# Patient Record
Sex: MALE | Race: WHITE | NOT HISPANIC OR LATINO
[De-identification: names, ages, dates, MRNs, and addresses within clinical notes are randomized per-mention and may not be internally consistent; named-entity substitution may affect disease eponyms.]

---
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
## 2018-01-04 ENCOUNTER — TRANSCRIPTION ENCOUNTER (OUTPATIENT)
Age: 18
End: 2018-01-04

## 2018-01-09 ENCOUNTER — APPOINTMENT (OUTPATIENT)
Dept: SURGERY | Facility: CLINIC | Age: 18
End: 2018-01-09
Payer: COMMERCIAL

## 2018-01-09 PROCEDURE — 99024 POSTOP FOLLOW-UP VISIT: CPT

## 2018-07-24 ENCOUNTER — APPOINTMENT (OUTPATIENT)
Dept: SURGERY | Facility: CLINIC | Age: 18
End: 2018-07-24
Payer: COMMERCIAL

## 2018-07-24 DIAGNOSIS — K40.90 UNILATERAL INGUINAL HERNIA, W/OUT OBSTRUCTION OR GANGRENE, NOT SPECIFIED AS RECURRENT: ICD-10-CM

## 2018-07-24 PROCEDURE — 99213 OFFICE O/P EST LOW 20 MIN: CPT

## 2018-10-09 ENCOUNTER — APPOINTMENT (OUTPATIENT)
Dept: SURGERY | Facility: CLINIC | Age: 18
End: 2018-10-09
Payer: COMMERCIAL

## 2018-10-09 DIAGNOSIS — Z87.2 PERSONAL HISTORY OF DISEASES OF THE SKIN AND SUBCUTANEOUS TISSUE: ICD-10-CM

## 2018-10-09 PROCEDURE — 99214 OFFICE O/P EST MOD 30 MIN: CPT

## 2019-03-25 ENCOUNTER — TRANSCRIPTION ENCOUNTER (OUTPATIENT)
Age: 19
End: 2019-03-25

## 2019-10-10 ENCOUNTER — APPOINTMENT (OUTPATIENT)
Dept: SURGERY | Facility: CLINIC | Age: 19
End: 2019-10-10
Payer: COMMERCIAL

## 2019-10-10 VITALS — WEIGHT: 145.2 LBS | HEIGHT: 70 IN | BODY MASS INDEX: 20.79 KG/M2

## 2019-10-10 DIAGNOSIS — S76.211A STRAIN OF ADDUCTOR MUSCLE, FASCIA AND TENDON OF RIGHT THIGH, INITIAL ENCOUNTER: ICD-10-CM

## 2019-10-10 PROCEDURE — 99214 OFFICE O/P EST MOD 30 MIN: CPT

## 2019-10-10 NOTE — ASSESSMENT
[FreeTextEntry1] : Juan presents back to the office approximately 2 years after the repair of his direct right inguinal hernia with mesh still complaining of intermittent discomfort in the right testicle not particularly associated with any significant physical activity. He has seen multiple urologists over the last 2 years with no identifiable source. He had a lower back MRI with no significant pathology. He has seen a pain management specialist who recommended gabapentin but did not recommend ilioinguinal nerve block injections since his pain does not appear to be radiating from his right groin. He presents to the office today for reevaluation and reassurance.\par \par Physical examination demonstrates no evidence of hernia recurrence or delayed wound complications in the right groin. Examination of his left groin demonstrates some mild weakness but no obvious hernia. Both testicles are normal. His umbilical examination is unremarkable. He has gained approximately 5 pounds over the last 2 years and his current BMI is 21.\par \par Juan was counseled and reassured. I agree with his multiple consultations in that there is probably very little to do for his intermittent mild right testicular discomfort other than possibly trying gabapentin, depending on how significant his symptoms are and if they are significant affecting his quality of life. He recently began an exercise regimen at the gym and has had no increased symptoms due to this activity. I believe he can continue exercising as tolerated. He was encouraged to return to me in the future if he develops any hernia related issues, of course.\par \par

## 2019-10-10 NOTE — PHYSICAL EXAM
[JVD] : no jugular venous distention  [Normal Breath Sounds] : Normal breath sounds [No Rash or Lesion] : No rash or lesion [Alert] : alert [Calm] : calm [de-identified] : healthy [de-identified] : normal [de-identified] : soft and flat [de-identified] : normal testicles [de-identified] : no hernia recurrence

## 2019-10-10 NOTE — CONSULT LETTER
[FreeTextEntry1] : Dear Dr. Polo Pierre, \par \par I had the pleasure of seeing your patient, KVNG ZARAGOZA, in my office today. Please see my note below. \par \par Thank you very much for allowing me to participate in the care of this patient. If you have any questions, please do not hesitate to contact me. \par \par \par Respectfully,\par \par Louis Priest M.D., FACS\par  \par \par \par cc: Dr. Cisco Vela\par

## 2019-11-18 ENCOUNTER — TRANSCRIPTION ENCOUNTER (OUTPATIENT)
Age: 19
End: 2019-11-18

## 2020-12-08 ENCOUNTER — APPOINTMENT (OUTPATIENT)
Dept: SURGERY | Facility: CLINIC | Age: 20
End: 2020-12-08
Payer: COMMERCIAL

## 2020-12-08 VITALS — WEIGHT: 135 LBS | HEIGHT: 70 IN | BODY MASS INDEX: 19.33 KG/M2

## 2020-12-08 DIAGNOSIS — N50.819 TESTICULAR PAIN, UNSPECIFIED: ICD-10-CM

## 2020-12-08 DIAGNOSIS — R10.31 RIGHT LOWER QUADRANT PAIN: ICD-10-CM

## 2020-12-08 PROCEDURE — 99072 ADDL SUPL MATRL&STAF TM PHE: CPT

## 2020-12-08 PROCEDURE — 99214 OFFICE O/P EST MOD 30 MIN: CPT

## 2020-12-08 NOTE — PHYSICAL EXAM
[Normal Breath Sounds] : Normal breath sounds [No Rash or Lesion] : No rash or lesion [Alert] : alert [JVD] : no jugular venous distention  [Anxious] : anxious [de-identified] : healthy [de-identified] : normal [de-identified] : soft and flat [de-identified] : normal testicles [de-identified] : no hernia recurrence

## 2020-12-08 NOTE — ASSESSMENT
[FreeTextEntry1] : Juan presents back to the office with his mother approximately 14 months after his most recent visit and more than 3 years after the repair of his direct right inguinal hernia with mesh and concomitant neurectomy on October 25, 2017 with concerns about persistent discomfort in the right groin and in the right testicle.\par \par Although I have seen him multiple times over the last 3 years, his symptoms had dramatically improved and at one point not too long ago he indicated that his symptoms in the right testicle were 1/10 compared to his preoperative symptoms. He has seen multiple specialists with regard to his chronic symptoms including multiple urologists and has had sonograms and an MRI the pelvis demonstrating no evidence of structural damage or recurrent hernia. He saw two surgeons in [facility], Dr. Duane Estrella and Dr. Renaldo Carvajal, who recommended multiple different strategies including nerve block injections (which he tried with little success) and possible reoperative surgery including removal of his old mesh and a new repair laparoscopically. They indicated concern with regard to his plug causing pain. This is highly unlikely as he has a medium-size plug (which is very small) under the floor of the inguinal canal where there are no nerves and not in the internal ring.\par \par Physical examination today demonstrates a very well-healed scar in the right groin with no evidence of hernia recurrence or delayed wound complications. There is no evidence of varicocele or hydrocele. His testicular examination is unremarkable. His cremasteric reflex is slightly delayed on the right as compared to the left. I examined him on 3 separate occasions during this 45 minute visit as the conversation progressed he thought of other concerns which he wanted me to evaluate and inspect.\par \par He was recommended to try gabapentin by a pain management physician in the past and by myself multiple occasions but he has not done so. I strongly reiterated this recommendation with Juan and his mother prior to any surgical reexploration. There is a reasonable chance he will have some improvement with gabapentin. Surgical exploration, removal of old mesh, neurectomies and/or any further reconstruction and/or repair of the right groin should only be considered as a very last option (especially since reoperative surgery carries its own set of risks and possible complications). If he is interested in a laparoscopic approach, he should followup with either of the 2 laparoscopic surgeons he saw in [facility].\par \par I have no other advice to offer Juan, but if he wants me to perform additional surgery in the future (again, as a very last option) he will return to me at that time.

## 2020-12-08 NOTE — CONSULT LETTER
[FreeTextEntry1] : \par Dear Dr. Polo Pierre, \par \par I had the pleasure of seeing your patient, KVNG ZARAGOZA, in my office today. Please see my note below. \par \par Thank you very much for allowing me to participate in the care of this patient. If you have any questions, please do not hesitate to contact me. \par \par \par Respectfully,\par \par Louis Priest M.D., FACS\par  \par \par \par cc: Dr. Cisco Vela

## 2021-02-27 ENCOUNTER — TRANSCRIPTION ENCOUNTER (OUTPATIENT)
Age: 21
End: 2021-02-27

## 2021-03-03 ENCOUNTER — TRANSCRIPTION ENCOUNTER (OUTPATIENT)
Age: 21
End: 2021-03-03

## 2021-03-04 ENCOUNTER — TRANSCRIPTION ENCOUNTER (OUTPATIENT)
Age: 21
End: 2021-03-04

## 2021-10-31 ENCOUNTER — TRANSCRIPTION ENCOUNTER (OUTPATIENT)
Age: 21
End: 2021-10-31

## 2023-05-03 ENCOUNTER — NON-APPOINTMENT (OUTPATIENT)
Age: 23
End: 2023-05-03

## 2023-08-25 ENCOUNTER — APPOINTMENT (OUTPATIENT)
Dept: PULMONOLOGY | Facility: CLINIC | Age: 23
End: 2023-08-25
Payer: COMMERCIAL

## 2023-08-25 VITALS
DIASTOLIC BLOOD PRESSURE: 82 MMHG | OXYGEN SATURATION: 99 % | RESPIRATION RATE: 14 BRPM | HEIGHT: 70 IN | SYSTOLIC BLOOD PRESSURE: 108 MMHG | WEIGHT: 170 LBS | BODY MASS INDEX: 24.34 KG/M2 | HEART RATE: 103 BPM

## 2023-08-25 DIAGNOSIS — R06.00 DYSPNEA, UNSPECIFIED: ICD-10-CM

## 2023-08-25 DIAGNOSIS — K21.9 GASTRO-ESOPHAGEAL REFLUX DISEASE W/OUT ESOPHAGITIS: ICD-10-CM

## 2023-08-25 PROCEDURE — 99203 OFFICE O/P NEW LOW 30 MIN: CPT

## 2023-08-25 NOTE — ASSESSMENT
[FreeTextEntry1] : The patient had a chest x-ray which questioned increased pulmonary vascularity. I do not necessarily see this on the films He had a echo which did not show any abnormalities. He is able to exercise at very high levels without problems. He is suffering from GERD.  I suggested starting OTC Pepcid.  He has an appointment with GI in the next several weeks. I will arrange for the patient to have full pulmonary function tests though I doubt we will see any dramatic abnormalities. I had a long discussion the patient and his father regarding my thoughts on the subject.  Anxiety indeed may be playing a role. We will follow-up once the PFTs become available.

## 2023-08-25 NOTE — HISTORY OF PRESENT ILLNESS
[TextBox_4] : I reviewed and interpreted any  OP CXR  available in the files I discussed the results today with the patient.

## 2023-08-25 NOTE — REASON FOR VISIT
[Initial] : an initial visit [TextBox_44] : The patient presents for evaluation.  He underwent a chest x-ray stating that he could not take a satisfying enough deep breaths.  Results of the x-ray question increased pulmonary vascularity.  Suggestion was made for possible PAH or intracardiac shunt.  The patient went to his cardiologist and reportedly had a normal echocardiogram.  There were no signs of increased pulmonary pressures or leakage in the right-sided heart valves.  The patient does not really have shortness of breath but has the inability to take a satisfying deep breath.  He can exercise at very high levels where he will walk on a stair climber for 15 to 20 minutes at a time he just recently went for a long hike followed by going to the gym to lift weights.  There was no signs of shortness of breath during any of these.  The patient states that when he is sitting there he will feel this weird sensation in the chest that will only disappear is if he could take a complete sigh breath.  He currently feels that he tries to stretch out his lungs but they want " go over the top" to complete the breath.  He finds this distressing.  Eventually he will be able to complete the breath.  The patient is also suffering from daily GERD and can feel the acid coming up into his chest.  Frequently the acid sensation in the breathing sensations go hand-in-hand.  The patient admits to having a lot of anxiety.  Primarily this runs in his father who is in attendance.  His father states that he feels his son is just incredibly anxious.  For instance he looked up everything in the books regarding any of these medical conditions.

## 2023-08-30 ENCOUNTER — APPOINTMENT (OUTPATIENT)
Dept: PULMONOLOGY | Facility: HOSPITAL | Age: 23
End: 2023-08-30
Payer: COMMERCIAL

## 2023-08-30 ENCOUNTER — OUTPATIENT (OUTPATIENT)
Dept: OUTPATIENT SERVICES | Facility: HOSPITAL | Age: 23
LOS: 1 days | End: 2023-08-30
Payer: COMMERCIAL

## 2023-08-30 DIAGNOSIS — R06.00 DYSPNEA, UNSPECIFIED: ICD-10-CM

## 2023-08-30 PROCEDURE — 94727 GAS DIL/WSHOT DETER LNG VOL: CPT

## 2023-08-30 PROCEDURE — 94729 DIFFUSING CAPACITY: CPT | Mod: 26

## 2023-08-30 PROCEDURE — 94727 GAS DIL/WSHOT DETER LNG VOL: CPT | Mod: 26

## 2023-08-30 PROCEDURE — 94729 DIFFUSING CAPACITY: CPT

## 2023-08-30 PROCEDURE — 94060 EVALUATION OF WHEEZING: CPT | Mod: 26

## 2023-08-30 PROCEDURE — 94070 EVALUATION OF WHEEZING: CPT

## 2023-08-31 DIAGNOSIS — R06.00 DYSPNEA, UNSPECIFIED: ICD-10-CM

## 2023-10-12 ENCOUNTER — APPOINTMENT (OUTPATIENT)
Dept: PULMONOLOGY | Facility: CLINIC | Age: 23
End: 2023-10-12

## 2023-12-14 ENCOUNTER — NON-APPOINTMENT (OUTPATIENT)
Age: 23
End: 2023-12-14

## 2024-02-11 ENCOUNTER — NON-APPOINTMENT (OUTPATIENT)
Age: 24
End: 2024-02-11

## 2024-03-07 ENCOUNTER — APPOINTMENT (OUTPATIENT)
Dept: OTOLARYNGOLOGY | Facility: CLINIC | Age: 24
End: 2024-03-07

## 2024-04-19 ENCOUNTER — NON-APPOINTMENT (OUTPATIENT)
Age: 24
End: 2024-04-19

## 2024-06-22 ENCOUNTER — NON-APPOINTMENT (OUTPATIENT)
Age: 24
End: 2024-06-22

## 2024-08-08 ENCOUNTER — APPOINTMENT (OUTPATIENT)
Dept: UROLOGY | Facility: CLINIC | Age: 24
End: 2024-08-08

## 2024-11-19 ENCOUNTER — APPOINTMENT (OUTPATIENT)
Dept: UROLOGY | Facility: CLINIC | Age: 24
End: 2024-11-19

## 2025-03-27 ENCOUNTER — NON-APPOINTMENT (OUTPATIENT)
Age: 25
End: 2025-03-27